# Patient Record
Sex: MALE | NOT HISPANIC OR LATINO | URBAN - METROPOLITAN AREA
[De-identification: names, ages, dates, MRNs, and addresses within clinical notes are randomized per-mention and may not be internally consistent; named-entity substitution may affect disease eponyms.]

---

## 2017-10-30 ENCOUNTER — COMMUNICATION - HEALTHEAST (OUTPATIENT)
Dept: FAMILY MEDICINE | Facility: CLINIC | Age: 22
End: 2017-10-30

## 2017-10-30 ENCOUNTER — OFFICE VISIT - HEALTHEAST (OUTPATIENT)
Dept: FAMILY MEDICINE | Facility: CLINIC | Age: 22
End: 2017-10-30

## 2017-10-30 DIAGNOSIS — L30.9 ECZEMA: ICD-10-CM

## 2018-10-24 ENCOUNTER — OFFICE VISIT - HEALTHEAST (OUTPATIENT)
Dept: FAMILY MEDICINE | Facility: CLINIC | Age: 23
End: 2018-10-24

## 2018-10-24 DIAGNOSIS — L20.82 FLEXURAL ECZEMA: ICD-10-CM

## 2018-10-24 DIAGNOSIS — R07.0 THROAT PAIN: ICD-10-CM

## 2018-10-24 LAB — DEPRECATED S PYO AG THROAT QL EIA: NORMAL

## 2018-10-24 RX ORDER — IBUPROFEN 200 MG
200 TABLET ORAL EVERY 6 HOURS PRN
Status: SHIPPED | COMMUNITY
Start: 2018-10-24

## 2018-10-25 LAB — GROUP A STREP BY PCR: NORMAL

## 2019-02-18 ENCOUNTER — OFFICE VISIT - HEALTHEAST (OUTPATIENT)
Dept: FAMILY MEDICINE | Facility: CLINIC | Age: 24
End: 2019-02-18

## 2019-02-18 DIAGNOSIS — K64.4 EXTERNAL HEMORRHOID: ICD-10-CM

## 2019-02-18 RX ORDER — WITCH HAZEL 0.5 MG/MG
SOLUTION RECTAL; TOPICAL
Refills: 0 | Status: SHIPPED | COMMUNITY
Start: 2019-02-18

## 2019-02-18 RX ORDER — HYDROCORTISONE 2.5 %
CREAM (GRAM) TOPICAL
Qty: 30 G | Refills: 0 | Status: SHIPPED | OUTPATIENT
Start: 2019-02-18

## 2019-04-18 ENCOUNTER — OFFICE VISIT - HEALTHEAST (OUTPATIENT)
Dept: FAMILY MEDICINE | Facility: CLINIC | Age: 24
End: 2019-04-18

## 2019-04-18 DIAGNOSIS — R07.0 THROAT PAIN: ICD-10-CM

## 2019-04-18 DIAGNOSIS — J06.9 VIRAL URI: ICD-10-CM

## 2019-04-18 DIAGNOSIS — J30.1 SEASONAL ALLERGIC RHINITIS DUE TO POLLEN: ICD-10-CM

## 2019-04-18 DIAGNOSIS — R05.9 COUGH: ICD-10-CM

## 2019-04-18 DIAGNOSIS — R50.9 FEVER, UNSPECIFIED FEVER CAUSE: ICD-10-CM

## 2019-04-18 LAB
DEPRECATED S PYO AG THROAT QL EIA: NORMAL
FLUAV AG SPEC QL IA: NORMAL
FLUBV AG SPEC QL IA: NORMAL

## 2019-04-18 RX ORDER — FEXOFENADINE HCL 180 MG/1
180 TABLET ORAL DAILY
Qty: 30 TABLET | Refills: 2 | Status: SHIPPED | OUTPATIENT
Start: 2019-04-18

## 2019-04-18 RX ORDER — FEXOFENADINE HCL AND PSEUDOEPHEDRINE HCL 180; 240 MG/1; MG/1
1 TABLET, EXTENDED RELEASE ORAL DAILY
Qty: 7 TABLET | Refills: 0 | Status: SHIPPED | OUTPATIENT
Start: 2019-04-18

## 2019-04-19 LAB — GROUP A STREP BY PCR: NORMAL

## 2021-05-27 NOTE — PROGRESS NOTES
Chief Complaint   Patient presents with     Headache     [id number],      Sore Throat     x1week ago       ASSESSMENT & PLAN:   Diagnoses and all orders for this visit:    Viral URI    Throat pain  -     Rapid Strep A Screen-Throat swab  -     Group A Strep, RNA Direct Detection, Throat    Fever, unspecified fever cause  -     Influenza A/B Rapid Test    Cough  -     Influenza A/B Rapid Test    Seasonal allergic rhinitis due to pollen  -     fexofenadine-pseudoephedrine (ALLEGRA-D 24) 180-240 mg per 24 hr tablet; Take 1 tablet by mouth daily.  Dispense: 7 tablet; Refill: 0  -     fexofenadine (ALLEGRA) 180 MG tablet; Take 1 tablet (180 mg total) by mouth daily. Start after Allegra-D.  Dispense: 30 tablet; Refill: 2        MDM:  Typical uri/viral sinusitis vs nearly resolved URI with seasonal pollen allergy overlapping. Will try course of Allegra-D for current sx.  May switch to plain Allegra once sx resolve.      Given history of allergies, I recommended trying these treatments in addition to Neti pot Tylenol or ibuprofen if necessary for 3-4 days and following up if no improvement in her frontal sinus congestion and tenderness or fever develops.  Pain is mild at 1/10 at this time.       Supportive care discussed.  See discharge instructions below for specific recommendations given.    At the end of the encounter, I discussed results, diagnosis, medications. Discussed red flags for immediate return to clinic/ER, as well as indications for follow up if no improvement. Patient and/or caregiver understood and agreed to plan. Patient was stable for discharge.    SUBJECTIVE    HPI:  Ambrocio Delgadillo presents to the walk-in clinic with 10 days of upper respiratory symptoms. Fever up to 103 with sweats.  Then started having sore throat, nasal congestion, malaise.  3 days after initial symptoms started, patient had headache and nasal congestion.  States has been using Neti pot with cold and flu medicine as well as ibuprofen  without much help in between.    Gotten better and is currently a 1 out of 10.  History includes allergic rhinitis.  Allergic to pollen.  Has had allergy pills in the past;doesn't remember name, but states they were effective.        History obtained from the patient.    No past medical history on file.    Problem List:  Allergic Rhinitis  Eczema  Major Depression, Single Episode  ADHD, Predominantly Inattentive Type  Proteinuria      Social History     Tobacco Use     Smoking status: Never Smoker     Smokeless tobacco: Never Used   Substance Use Topics     Alcohol use: Not on file       Review of Systems   Constitutional: Positive for diaphoresis. Negative for chills. Fever: see HPI    HENT: Positive for congestion, sinus pressure and sinus pain. Negative for ear pain and sore throat.    Eyes: Negative for pain and itching.   Respiratory: Positive for cough. Negative for shortness of breath.    Skin: Negative for rash.   Allergic/Immunologic: Positive for environmental allergies.       OBJECTIVE    Vitals:    04/18/19 1007   BP: 132/87   Patient Site: Right Arm   Patient Position: Sitting   Cuff Size: Adult Regular   Pulse: 60   Resp: 18   Temp: 98.2  F (36.8  C)   TempSrc: Oral   SpO2: 99%   Weight: 195 lb (88.5 kg)       Physical Exam   Constitutional: He is oriented to person, place, and time. He appears well-developed and well-nourished. No distress.   HENT:   Right Ear: Tympanic membrane and external ear normal.   Left Ear: Tympanic membrane and external ear normal.   Nose: Rhinorrhea present. Right sinus exhibits frontal sinus tenderness. Right sinus exhibits no maxillary sinus tenderness. Left sinus exhibits frontal sinus tenderness. Left sinus exhibits no maxillary sinus tenderness.   Mouth/Throat: Posterior oropharyngeal erythema (Mild) present. No oropharyngeal exudate, posterior oropharyngeal edema or tonsillar abscesses. Tonsils are 2+ on the right. Tonsils are 2+ on the left. No tonsillar exudate.    Worse pain to mid forehead above eyebrow line.   Eyes: Conjunctivae are normal. Right eye exhibits no discharge. Left eye exhibits no discharge.   Cardiovascular: Intact distal pulses.   Pulmonary/Chest: Effort normal.   Musculoskeletal: Normal range of motion.   Neurological: He is alert and oriented to person, place, and time.   Skin: Skin is warm and dry. Capillary refill takes less than 2 seconds.   Psychiatric: He has a normal mood and affect. His behavior is normal. Judgment and thought content normal.       Labs:  Recent Results (from the past 240 hour(s))   Rapid Strep A Screen-Throat swab   Result Value Ref Range    Rapid Strep A Antigen No Group A Strep detected, presumptive negative No Group A Strep detected, presumptive negative   Group A Strep, RNA Direct Detection, Throat   Result Value Ref Range    Group A Strep by PCR No Group A Strep rRNA detected No Group A Strep rRNA detected   Influenza A/B Rapid Test   Result Value Ref Range    Influenza  A, Rapid Antigen No Influenza A antigen detected No Influenza A antigen detected    Influenza B, Rapid Antigen No Influenza B antigen detected No Influenza B antigen detected         Radiology:    No results found.    PATIENT INSTRUCTIONS:   There are no Patient Instructions on file for this visit.

## 2021-05-31 VITALS — WEIGHT: 204 LBS

## 2021-06-02 VITALS — WEIGHT: 202 LBS

## 2021-06-02 VITALS — WEIGHT: 207 LBS

## 2021-06-02 VITALS — WEIGHT: 195 LBS

## 2021-06-13 NOTE — PROGRESS NOTES
Assessment:     1. Eczema  triamcinolone (KENALOG) 0.1 % cream          Plan:     Scription given for triamcinolone cream to apply topically to his rash.  I recommend he avoid using it on his eyelids however.  Recommend he use Aquaphor twice daily.  Note given for work stating that he should refrain from shaving facial hair to prevent outbreaks of his eczema.    Subjective:       22 y.o. male presents for evaluation of eczema.  He has been having a lot of problems with it is on his arms as well as on his neck and face.  He is only ever use Aquaphor on it.  He has had particular problems recently anytime he shaves it seems to flareup his eczema area that he shaves.  His workplace recently required that he be clean shaven and he is wondering if he can get a note for work as well as to find out what else he can put on his rash.  He has a history of seasonal allergies.  Takes Claritin for this when they are bad no history of asthma.    The following portions of the patient's history were reviewed and updated as appropriate: allergies, current medications, past family history, past medical history, past social history, past surgical history and problem list.    Review of Systems  A 12 point comprehensive review of systems was negative except as noted.     Objective:        Vitals:    10/30/17 1711   BP: 122/74   Pulse: (!) 49   Resp: 18   Temp: 98.4  F (36.9  C)   SpO2: 98%     General: Alert, no distress  Skin: Patient has eczematous patches on the inner aspect of his elbows as well as some on his neck and chin and eyelids.    This note has been dictated using voice recognition software. Any grammatical or context distortions are unintentional and inherent to the software

## 2021-06-17 NOTE — PATIENT INSTRUCTIONS - HE
Patient Instructions by Corby Chew DO at 2/18/2019  1:40 PM     Author: Corby Chew DO Service: -- Author Type: Physician    Filed: 2/18/2019  2:00 PM Encounter Date: 2/18/2019 Status: Addendum    : Corby Chew DO (Physician)    Related Notes: Original Note by Corby Chew DO (Physician) filed at 2/18/2019  2:00 PM       Use referral to specialist if condition is not better in 1 week. If the hemorrhoid becomes hard and purple color, you could see a primary care provider, or return to walk in care for possible incision and drainage. Start treatment with the topical steroid cream and witch hazel wipes today.      Patient Education     Diagnosing Hemorrhoids    To diagnose hemorrhoids, your healthcare provider will rule out other problems and determine how bad your hemorrhoids are. After the evaluation, your healthcare provider will help you decide on a treatment plan thats best for you.  Medical history  A medical history helps your healthcare provider learn more about your symptoms and overall health. This often includes questions about your bowel habits and diet. You may also be asked how often you exercise and whether you take any medicines. Be sure to mention if any members of your family have had cancer or polyps of the colon.  Physical exam  During a physical exam, youll be asked to lie on an exam table. Youll then be examined for signs of swollen hemorrhoids and other problems. The exam takes just a few minutes. It is usually not painful:    A visual exam is used to view the outer anal skin.    A digital rectal exam is used to check for hemorrhoids or other problems in the anal canal. It is done using a lubricated gloved finger.    An anoscopic exam is done using a special viewing tube called an anoscope. The scope helps your healthcare provider view the anal canal.  Grading hemorrhoids  Based on the physical exam, your [facility] provider may assign a grade to internal hemorrhoids. The  grades are based on the severity of your symptoms:    Grade I hemorrhoids do not protrude from the anus. They may bleed, but otherwise cause few symptoms.    Grade II hemorrhoids protrude from the anus during bowel movements. They reduce back into the anal canal when straining stops.    Grade III hemorrhoids protrude on their own or with straining. They do not reduce by themselves, but can be pushed back into place.    Grade IV hemorrhoids protrude and cannot be reduced at all. They can also be painful and may need prompt treatment.  Pregnancy and hemorrhoids  Many women develop hemorrhoids during pregnancy and childbirth. This is likely caused by pressure on the pelvis and by hormonal changes. In most cases, the hemorrhoids will eventually go away on their own. In the meantime, talk with your healthcare provider about ways to help relieve your symptoms.   Other anal problems  Below are common problems that can cause symptoms similar to hemorrhoids. Your healthcare provider can explain your treatment choices:    A fissure is a small tear or crack in the lining of the anus. It can be caused by hard bowel movements, diarrhea, or inflammation in the rectal area. Fissures can bleed and cause painful bowel movements.    An abscess is an infected gland in the anal canal. The infected area swells and often causes pain.    A fistula is a pathway that may form when an anal abscess drains. The pathway may remain after the abscess is gone. Fistulas are not usually painful. But they can cause drainage where the pathway meets the skin.   Date Last Reviewed: 7/1/2016 2000-2017 [employer]. [address] [place] [address], [place] [address]. All rights reserved. This information is not intended as a substitute for professional medical care. Always follow your healthcare professional's instructions.           Patient Education     Treating Hemorrhoids: Self-Care    Follow your healthcare providers advice about caring for  your hemorrhoids at home. Some treatments help relieve symptoms right away. Others involve making changes in your diet and exercise habits. These can help ease constipation and prevent hemorrhoid symptoms from coming back.  Relieving symptoms  Your healthcare provider may prescribe anti-inflammatory medicine to help ease your symptoms. The following tips will also help relieve pain and swelling.    Take sitz baths. Taking a sitz bath means sitting in a few inches of warm bath water. Soaking for 10 minutes twice a day can provide welcome relief from painful hemorrhoids. It can also help the area stay clean.    Develop good bowel habits. Use the bathroom when you need to. Dont ignore the urge to move your bowels. This can lead to constipation, hard stools, and straining. Also, dont read while on the toilet. Sit only as long as needed. Wipe gently with soft, unscented toilet tissue or baby wipes.    Use ice packs. Placing an ice pack on a thrombosed external hemorrhoid can help relieve pain right away. It will also help reduce the blood clot. Use the ice for 15 to 20 minutes at a time. Keep a cloth between the ice and your skin to prevent skin damage.    Use other measures. Laxatives and enemas can help ease constipation. But use them only on your healthcare providers advice. For symptom relief, try using cotton pads soaked in witch hazel. These are available at most drugstores. Over-the-counter hemorrhoid ointments and petroleum jelly can also provide relief.  Add fiber to your diet  Adding fiber to your diet can help relieve constipation by making stools softer and easier to pass. To increase your fiber intake, your healthcare provider may recommend a bulking agent, such as psyllium. This is a high-fiber supplement available at most grocery stores and drugstores. Eating more fiber-rich foods will also help. There are two types of fiber:    Insoluble fiber is the main ingredient in bulking agents. Its also found in  foods such as wheat bran, whole-grain breads, fresh fruits, and vegetables.    Soluble fiber is found in foods such as oat bran. Although soluble fiber is good for you, it may not ease constipation as much as foods high in insoluble fiber.  Drink more water  Along with a high-fiber diet, drinking more water can help ease constipation. This is because insoluble fiber absorbs water, making stools soft and bulky. Be sure to drink plenty of water throughout the day. Drinking fruit juices, such as prune juice or apple juice, can also help prevent constipation.  Get more exercise  Regular exercise aids digestion and helps prevent constipation. Its also great for your health. So talk with your healthcare provider about starting an exercise program. Low-impact activities, such as swimming or walking, are good places to start. Take it easy at first. And remember to drink plenty of water when you exercise.  High-fiber foods  High-fiber foods offer many benefits. By making your stools softer, they help heal and prevent swollen hemorrhoids. They may also help reduce the risk of colon and rectal cancer. Best of all, theyre usually low in calories and taste great. Here are some examples of fiber-rich foods.    Whole grains, such as wheat bran, corn bran, and brown rice.    Vegetables, especially carrots, broccoli, cabbage, and peas.    Fruits, such as apples, bananas, raisins, peaches, and pears.    Nuts and legumes, especially peanuts, lentils, and kidney beans.  Easy ways to add fiber  The tips below offer some simple ways to add more high-fiber foods to your meals.    Start your day with a high-fiber breakfast. Eat a wheat bran cereal along with a sliced banana. Or, try peanut butter on whole-wheat toast.    Eat carrot sticks for snacks. Theyre easy to prepare, taste great, and are low in calories.    Use whole-grain breads instead of white bread for sandwiches.    Eat fruits for treats. Try an apple and some raisins instead  of a candy bar.   Date Last Reviewed: 7/1/2016 2000-2017 [employer]. [address] [place] [address], [place] [address]. All rights reserved. This information is not intended as a substitute for professional medical care. Always follow your healthcare professional's instructions.           Patient Education     Treating Hemorrhoids: Removal  If your symptoms persist, your healthcare provider may recommend removing the hemorrhoid. This can be done in your healthcare provider's office or at a surgical center. In most cases, no special preparation is needed. Keep in mind that your treatment may differ depending on your symptoms and the location of the hemorrhoid.           Internal hemorrhoids  Youll be asked to lie or kneel on a table. Your healthcare provider then inserts an anoscope to view the anal canal. To treat the hemorrhoid, your healthcare provider will use one of the methods listed below. Because internal hemorrhoids do not have nerves that sense pain, you wont have too much discomfort. You can often return to your normal routine the same day. If you have many hemorrhoids, you may need repeated treatments.  Banding  The banding method is done by placing tight elastic bands around the base of the hemorrhoid. This cuts off blood supply to the hemorrhoid, causing it to fall off. This usually takes about a week. The area then heals within a few days.  Infrared coagulation  This procedure is done using a small probe that exposes the hemorrhoid to short bursts of infrared light. This seals off the blood vessel, causing it to shrink. Slight bleeding may happen for a few days. The area usually heals within a week or two.  Sclerotherapy  Sclerotherapy is done by injecting a chemical into the tissue around the hemorrhoid. The chemical causes the hemorrhoid to shrink within a few days. Bleeding usually stops in about 24 hours.  Thrombosed external hemorrhoids  Thrombosed external hemorrhoids are often very  painful. Thats because the swollen hemorrhoid stretches the sensitive skin around it. To relieve the pain, your healthcare provider may remove the blood clot. This takes just a few minutes. You may need to rest for a few days before returning to work.    Numbing the hemorrhoid. Youll be asked to lie or kneel on a table. The hemorrhoid is then injected with a local anesthetic. This may cause some discomfort for a moment. But within a short time your healthcare provider will be able to remove the hemorrhoid without causing pain.    Removing the hemorrhoid. A small incision is made to remove the blood clot. The hemorrhoid may also be removed. The skin is then either closed with sutures or left open to heal on its own. The area around the incision will likely be sore for a few days. But your pain should improve soon after the procedure.     Risks and complications  The possible risks and complications include:    Infection    Bleeding    Trouble urinating (Doesn't happen with thrombosed external hemorrhoids)    Narrowing of the anal canal (very rare, doesn't happen with thrombosed external hemorrhoids)  When to call your healthcare provider  After your procedure, call your healthcare provider if you have:    Increasing pain    Fever or chills    Persistent bleeding    Trouble urinating   Date Last Reviewed: 7/1/2016 2000-2017 [employer]. [address] [place] [address], [place] [address]. All rights reserved. This information is not intended as a substitute for professional medical care. Always follow your healthcare professional's instructions.

## 2021-06-19 NOTE — LETTER
Letter by Anne Vu CNP at      Author: Anne Vu CNP Service: -- Author Type: --    Filed:  Encounter Date: 4/18/2019 Status: (Other)         April 18, 2019     Patient: Ambrocio Delgadillo   YOB: 1995   Date of Visit: 4/18/2019       To Whom it May Concern:    Ambrocio Delgadillo was seen in my clinic on 4/18/2019. He may return to school on 4/18 after 2pm.    If you have any questions or concerns, please don't hesitate to call.    Sincerely,         Electronically signed by Anne Vu CNP

## 2021-06-21 NOTE — PROGRESS NOTES
Assessment:       Eczema, acute on chronic flare  Throat pain.      Plan:       1. Topical steroids per orders  2. Discussed use of emollients and general care for eczema   3. Salt water gargles and OTC discussed  4. In process overnight strep test - will contact ONLY if test is positive  5. Discussed signs of worsening symptoms and when to follow-up with PCP if no symptom improvement.      Patient Instructions   Your rash is most likely due to Eczema. This is a chronic condition, which can improve and then flare periodically. It is not curable, although symptoms can be controlled with a variety of self-care measures and medication.    Management of symptoms:  - Avoid frequent bathing and hot water baths (temperature should be luke warm)  - Use an unscented, mild soap  - Apply emollient (e.g. Aquaphor, Cetaphil) immediately after bathing to keep skin moist  - Apply steroid creams as prescribed to reduce itchiness (do not apply to face or eye)    If symptoms have not improved after 2 weeks of treatment with topical steroids, recommend following up with their primary care provider.    Sore throat    Rapid Strep test was negative.     If overnight test returns positive, we will call tomorrow and call in antibiotic prescription.    No notification means that overnight test returned negative.    Symptoms are likely due to viral infection that will resolve on its own in 3-7 days.    May continue with symptomatic treatments including:  -salt water gargles  -warm beverages such as a non-caffeinated tea with honey  -throat drops  -ibuprofen or Tylenol for pain or fever    If fevers not coming down with Tylenol or ibuprofen, shortness of breath, not tolerating oral liquid intake, drooling, or stiff neck, return for evaluation immediately. Otherwise, if no improvement in the next week, follow up with primary care provider.        Subjective:       Ambrocio Delgadillo is a 23 y.o. male with history of eczema here for evaluation of  sore throat and a rash. Onset of throat pain was 1 week ago. Associated symptoms include subjective fevers and body aches. Symptoms have gradually been improving since onset. Patient also notes a rash that he feels is a flare of his eczema that started 1 month ago with no improvement. Rash is on the flexeral surfaces of the elbows, neck, and right upper eyelid. Associated symptoms include right eye crusting with clear drainage. Treatment has included an eczema lotion. Patient states that he baths frequently. Rash will worsen when he is physically active such as playing basketball.    The following portions of the patient's history were reviewed and updated as appropriate: allergies, current medications and problem list.    Review of Systems  Pertinent items are noted in HPI.     Allergies  No Known Allergies      Objective:       /66 (Patient Site: Right Arm, Patient Position: Sitting, Cuff Size: Adult Regular)  Pulse 66  Temp 98.7  F (37.1  C) (Oral)   Resp 18  Wt 207 lb (93.9 kg)  SpO2 98%  General appearance: alert, appears stated age, cooperative, no distress and non-toxic  Head: Normocephalic, without obvious abnormality, atraumatic, sinuses nontender to percussion  Eyes: conjunctivae/sclera clear; PERRL; erythema and crusting at the right upper eyelid  Ears: TM's intact wiht mild serous fluid, no erythema or bulging; external ears normal  Nose: no discharge  Throat: lips, mucosa, and tongue normal; teeth and gums normal  Neck: no adenopathy and supple, symmetrical, trachea midline  Lungs: clear to auscultation bilaterally  Heart: regular rate and rhythm, S1, S2 normal, no murmur, click, rub or gallop  Skin: erythematous plaques affecting the flexural surfaces of the elbows bilaterally and the right lateral neck, some lichenification; skin intact, no increased warmth to touch     Lab Results    Recent Results (from the past 24 hour(s))   Rapid Strep A Screen- Throat Swab   Result Value Ref Range     Rapid Strep A Antigen No Group A Strep detected, presumptive negative No Group A Strep detected, presumptive negative     I personally reviewed these results and discussed findings with the patient.

## 2021-06-27 NOTE — PROGRESS NOTES
Progress Notes by Corby Chew DO at 2/18/2019  1:40 PM     Author: Corby Chew DO Service: -- Author Type: Physician    Filed: 2/18/2019  2:10 PM Encounter Date: 2/18/2019 Status: Signed    : Corby Chew DO (Physician)       Chief Complaint   Patient presents with   ? Mass     on anus x3 days        History of Present Illness: Rooming staff notes reviewed. Patient has a painful mass area in his anal area. He normally has a BM daily, but more recently has not had a BM for about 3 days.  He states he is still been eating regularly.  He does not feel recently constipated.  He has no prior history of hemorrhoids.  I discussed his slower heart rate at exam.  He does not feel dizzy.  He plays basketball 5 days/week, so I told him it is likely physiologically normal for him to have a slower resting heart rate.    Review of systems: See history of present illness, all others negative.     Current Outpatient Medications   Medication Sig Dispense Refill   ? ibuprofen (ADVIL,MOTRIN) 200 MG tablet Take 200 mg by mouth every 6 (six) hours as needed for pain.     ? hydrocortisone 2.5 % cream Apply to anal hemorrhoid 3 times daily for up to 1 week. 30 g 0   ? witch hazel (TUCKS) 50 % PadM Use up to 4 times daily for relief of hemorrhoid discomfort.  0     No current facility-administered medications for this visit.      No past medical history on file.   No past surgical history on file.   Social History     Tobacco Use   ? Smoking status: Never Smoker   ? Smokeless tobacco: Never Used   Substance Use Topics   ? Alcohol use: Not on file   ? Drug use: Not on file        No family history on file.    Vitals:    02/18/19 1337   BP: 132/77   Patient Site: Right Arm   Patient Position: Sitting   Cuff Size: Adult Regular   Pulse: (!) 41   Resp: 16   Temp: 98.2  F (36.8  C)   TempSrc: Oral   SpO2: 98%   Weight: 202 lb (91.6 kg)     Wt Readings from Last 3 Encounters:   02/18/19 202 lb (91.6 kg)   10/24/18 207 lb (93.9  kg)   10/30/17 204 lb (92.5 kg)     Temp Readings from Last 3 Encounters:   02/18/19 98.2  F (36.8  C) (Oral)   10/24/18 98.7  F (37.1  C) (Oral)   10/30/17 98.4  F (36.9  C) (Oral)     BP Readings from Last 3 Encounters:   02/18/19 132/77   10/24/18 106/66   10/30/17 122/74     Pulse Readings from Last 3 Encounters:   02/18/19 (!) 41   10/24/18 66   10/30/17 (!) 49     EXAM:   General: Vital signs reviewed.  Patient is in no acute appearing distress.  Breathing appears nonlabored.  Patient is alert and oriented ×3.  Rectal exam was done, with a visible rectal mass measuring about 1.5 cm diameter at about the 4:00 o'clock position of outer anal region, which has a color similar to surrounding rectal tissue.  This area is tender.  A digital rectal exam shows that the rectal mass does not continue into the inner rectal region.  No palpable stool mass noted in the rectum.    Assessment/Plan   1. External hemorrhoid  hydrocortisone 2.5 % cream    witch hazel (TUCKS) 50 % PadM    Ambulatory referral to Colorectal Surgery       Patient Instructions     Use referral to specialist if condition is not better in 1 week. If the hemorrhoid becomes hard and purple color, you could see a primary care provider, or return to walk in care for possible incision and drainage. Start treatment with the topical steroid cream and witch hazel wipes today.      Patient Education     Diagnosing Hemorrhoids    To diagnose hemorrhoids, your healthcare provider will rule out other problems and determine how bad your hemorrhoids are. After the evaluation, your healthcare provider will help you decide on a treatment plan thats best for you.  Medical history  A medical history helps your healthcare provider learn more about your symptoms and overall health. This often includes questions about your bowel habits and diet. You may also be asked how often you exercise and whether you take any medicines. Be sure to mention if any members of your family  have had cancer or polyps of the colon.  Physical exam  During a physical exam, youll be asked to lie on an exam table. Youll then be examined for signs of swollen hemorrhoids and other problems. The exam takes just a few minutes. It is usually not painful:    A visual exam is used to view the outer anal skin.    A digital rectal exam is used to check for hemorrhoids or other problems in the anal canal. It is done using a lubricated gloved finger.    An anoscopic exam is done using a special viewing tube called an anoscope. The scope helps your healthcare provider view the anal canal.  Grading hemorrhoids  Based on the physical exam, your [facility] provider may assign a grade to internal hemorrhoids. The grades are based on the severity of your symptoms:    Grade I hemorrhoids do not protrude from the anus. They may bleed, but otherwise cause few symptoms.    Grade II hemorrhoids protrude from the anus during bowel movements. They reduce back into the anal canal when straining stops.    Grade III hemorrhoids protrude on their own or with straining. They do not reduce by themselves, but can be pushed back into place.    Grade IV hemorrhoids protrude and cannot be reduced at all. They can also be painful and may need prompt treatment.  Pregnancy and hemorrhoids  Many women develop hemorrhoids during pregnancy and childbirth. This is likely caused by pressure on the pelvis and by hormonal changes. In most cases, the hemorrhoids will eventually go away on their own. In the meantime, talk with your healthcare provider about ways to help relieve your symptoms.   Other anal problems  Below are common problems that can cause symptoms similar to hemorrhoids. Your healthcare provider can explain your treatment choices:    A fissure is a small tear or crack in the lining of the anus. It can be caused by hard bowel movements, diarrhea, or inflammation in the rectal area. Fissures can bleed and cause painful bowel  movements.    An abscess is an infected gland in the anal canal. The infected area swells and often causes pain.    A fistula is a pathway that may form when an anal abscess drains. The pathway may remain after the abscess is gone. Fistulas are not usually painful. But they can cause drainage where the pathway meets the skin.   Date Last Reviewed: 7/1/2016 2000-2017 [employer]. [address] [place] [address], [place] [address]. All rights reserved. This information is not intended as a substitute for professional medical care. Always follow your healthcare professional's instructions.           Patient Education     Treating Hemorrhoids: Self-Care    Follow your healthcare providers advice about caring for your hemorrhoids at home. Some treatments help relieve symptoms right away. Others involve making changes in your diet and exercise habits. These can help ease constipation and prevent hemorrhoid symptoms from coming back.  Relieving symptoms  Your healthcare provider may prescribe anti-inflammatory medicine to help ease your symptoms. The following tips will also help relieve pain and swelling.    Take sitz baths. Taking a sitz bath means sitting in a few inches of warm bath water. Soaking for 10 minutes twice a day can provide welcome relief from painful hemorrhoids. It can also help the area stay clean.    Develop good bowel habits. Use the bathroom when you need to. Dont ignore the urge to move your bowels. This can lead to constipation, hard stools, and straining. Also, dont read while on the toilet. Sit only as long as needed. Wipe gently with soft, unscented toilet tissue or baby wipes.    Use ice packs. Placing an ice pack on a thrombosed external hemorrhoid can help relieve pain right away. It will also help reduce the blood clot. Use the ice for 15 to 20 minutes at a time. Keep a cloth between the ice and your skin to prevent skin damage.    Use other measures. Laxatives and enemas can help  ease constipation. But use them only on your healthcare providers advice. For symptom relief, try using cotton pads soaked in witch hazel. These are available at most drugstores. Over-the-counter hemorrhoid ointments and petroleum jelly can also provide relief.  Add fiber to your diet  Adding fiber to your diet can help relieve constipation by making stools softer and easier to pass. To increase your fiber intake, your healthcare provider may recommend a bulking agent, such as psyllium. This is a high-fiber supplement available at most grocery stores and drugstores. Eating more fiber-rich foods will also help. There are two types of fiber:    Insoluble fiber is the main ingredient in bulking agents. Its also found in foods such as wheat bran, whole-grain breads, fresh fruits, and vegetables.    Soluble fiber is found in foods such as oat bran. Although soluble fiber is good for you, it may not ease constipation as much as foods high in insoluble fiber.  Drink more water  Along with a high-fiber diet, drinking more water can help ease constipation. This is because insoluble fiber absorbs water, making stools soft and bulky. Be sure to drink plenty of water throughout the day. Drinking fruit juices, such as prune juice or apple juice, can also help prevent constipation.  Get more exercise  Regular exercise aids digestion and helps prevent constipation. Its also great for your health. So talk with your healthcare provider about starting an exercise program. Low-impact activities, such as swimming or walking, are good places to start. Take it easy at first. And remember to drink plenty of water when you exercise.  High-fiber foods  High-fiber foods offer many benefits. By making your stools softer, they help heal and prevent swollen hemorrhoids. They may also help reduce the risk of colon and rectal cancer. Best of all, theyre usually low in calories and taste great. Here are some examples of fiber-rich foods.    Whole  grains, such as wheat bran, corn bran, and brown rice.    Vegetables, especially carrots, broccoli, cabbage, and peas.    Fruits, such as apples, bananas, raisins, peaches, and pears.    Nuts and legumes, especially peanuts, lentils, and kidney beans.  Easy ways to add fiber  The tips below offer some simple ways to add more high-fiber foods to your meals.    Start your day with a high-fiber breakfast. Eat a wheat bran cereal along with a sliced banana. Or, try peanut butter on whole-wheat toast.    Eat carrot sticks for snacks. Theyre easy to prepare, taste great, and are low in calories.    Use whole-grain breads instead of white bread for sandwiches.    Eat fruits for treats. Try an apple and some raisins instead of a candy bar.   Date Last Reviewed: 7/1/2016 2000-2017 [employer]. [address] [place] [address]. All rights reserved. This information is not intended as a substitute for professional medical care. Always follow your healthcare professional's instructions.           Patient Education     Treating Hemorrhoids: Removal  If your symptoms persist, your healthcare provider may recommend removing the hemorrhoid. This can be done in your healthcare provider's office or at a surgical center. In most cases, no special preparation is needed. Keep in mind that your treatment may differ depending on your symptoms and the location of the hemorrhoid.           Internal hemorrhoids  Youll be asked to lie or kneel on a table. Your healthcare provider then inserts an anoscope to view the anal canal. To treat the hemorrhoid, your healthcare provider will use one of the methods listed below. Because internal hemorrhoids do not have nerves that sense pain, you wont have too much discomfort. You can often return to your normal routine the same day. If you have many hemorrhoids, you may need repeated treatments.  Banding  The banding method is done by placing tight elastic bands around the  base of the hemorrhoid. This cuts off blood supply to the hemorrhoid, causing it to fall off. This usually takes about a week. The area then heals within a few days.  Infrared coagulation  This procedure is done using a small probe that exposes the hemorrhoid to short bursts of infrared light. This seals off the blood vessel, causing it to shrink. Slight bleeding may happen for a few days. The area usually heals within a week or two.  Sclerotherapy  Sclerotherapy is done by injecting a chemical into the tissue around the hemorrhoid. The chemical causes the hemorrhoid to shrink within a few days. Bleeding usually stops in about 24 hours.  Thrombosed external hemorrhoids  Thrombosed external hemorrhoids are often very painful. Thats because the swollen hemorrhoid stretches the sensitive skin around it. To relieve the pain, your healthcare provider may remove the blood clot. This takes just a few minutes. You may need to rest for a few days before returning to work.    Numbing the hemorrhoid. Youll be asked to lie or kneel on a table. The hemorrhoid is then injected with a local anesthetic. This may cause some discomfort for a moment. But within a short time your healthcare provider will be able to remove the hemorrhoid without causing pain.    Removing the hemorrhoid. A small incision is made to remove the blood clot. The hemorrhoid may also be removed. The skin is then either closed with sutures or left open to heal on its own. The area around the incision will likely be sore for a few days. But your pain should improve soon after the procedure.     Risks and complications  The possible risks and complications include:    Infection    Bleeding    Trouble urinating (Doesn't happen with thrombosed external hemorrhoids)    Narrowing of the anal canal (very rare, doesn't happen with thrombosed external hemorrhoids)  When to call your healthcare provider  After your procedure, call your healthcare provider if you  have:    Increasing pain    Fever or chills    Persistent bleeding    Trouble urinating   Date Last Reviewed: 7/1/2016 2000-2017 [employer]. [address] [place] [address], [place] [address]. All rights reserved. This information is not intended as a substitute for professional medical care. Always follow your healthcare professional's instructions.              Corby Chew, DO